# Patient Record
(demographics unavailable — no encounter records)

---

## 2024-10-23 NOTE — DISCUSSION/SUMMARY
[FreeTextEntry1] :   RAHEEL VILLA  presents today with right otitis media and will be treated with Amoxicillin.

## 2024-10-23 NOTE — HISTORY OF PRESENT ILLNESS
[FreeTextEntry6] : RAHEEL presents today with ear pain for 1 day. She was given Motrin that provided pain relief.

## 2024-10-23 NOTE — PHYSICAL EXAM
[Acute Distress] : no acute distress [Tenderness] : tenderness [EOMI] : grossly EOMI [Clear] : right tympanic membrane not clear [Erythema] : erythema [Bulging] : bulging [Erythematous Oropharynx] : nonerythematous oropharynx

## 2024-12-02 NOTE — PHYSICAL EXAM
[Acute Distress] : no acute distress [Alert] : alert [Tenderness] : no tenderness [EOMI] : grossly EOMI [Clear] : right tympanic membrane not clear [Erythema] : erythema [Bulging] : bulging [Purulent Effusion] : no purulent effusion [Erythematous Oropharynx] : nonerythematous oropharynx [Clear to Auscultation Bilaterally] : clear to auscultation bilaterally [Transmitted Upper Airway Sounds] : no transmitted upper airway sounds [Subcostal Retractions] : no subcostal retractions [Regular Rate and Rhythm] : regular rate and rhythm [Soft] : soft [Tender] : nontender

## 2024-12-02 NOTE — DISCUSSION/SUMMARY
[FreeTextEntry1] : RAHEEL presents today with right middle ear effusion, will send a wait and see antibiotic.

## 2024-12-18 NOTE — HISTORY OF PRESENT ILLNESS
[FreeTextEntry6] : RAHEEL presents today with a rash on her abdomen, no other sick contacts at home.

## 2025-01-13 NOTE — DISCUSSION/SUMMARY
[FreeTextEntry1] : RAHEEL presents today with cough and fever. She does have middle ear effusion. She will have a cbc to evaluate for any immune changes.

## 2025-01-13 NOTE — PHYSICAL EXAM
[Alert] : alert [EOMI] : grossly EOMI [Clear] : right tympanic membrane clear [Clear to Auscultation Bilaterally] : clear to auscultation bilaterally [Acute Distress] : no acute distress [Tenderness] : no tenderness [Pink Nasal Mucosa] : nasal mucosa not pink [Erythematous Oropharynx] : nonerythematous oropharynx [Transmitted Upper Airway Sounds] : no transmitted upper airway sounds [Subcostal Retractions] : no subcostal retractions

## 2025-02-17 NOTE — PHYSICAL EXAM
[Tenderness] : tenderness [EOMI] : grossly EOMI [Pink Nasal Mucosa] : nasal mucosa not pink [Erythematous Oropharynx] : nonerythematous oropharynx [Clear to Auscultation Bilaterally] : clear to auscultation bilaterally [Transmitted Upper Airway Sounds] : no transmitted upper airway sounds [Regular Rate and Rhythm] : regular rate and rhythm [Soft] : soft [Tender] : nontender [FreeTextEntry3] : Scattered erythema with crusting on outer ear with middle ear effusion

## 2025-02-17 NOTE — DISCUSSION/SUMMARY
[FreeTextEntry1] : RAHEEL presents today with impetigo on her outer ear, and will be treated with topical mupirocin. Will also send a wait and see oral Amoxicillin.

## 2025-04-10 NOTE — HISTORY OF PRESENT ILLNESS
[FreeTextEntry6] : RAHEEL presents today with fever and decreased energy. No other sick contacts at home.

## 2025-04-10 NOTE — DISCUSSION/SUMMARY
[FreeTextEntry1] : RAHEEL VILLA  presents today with sore throat, POCT strep is negative. Will defer antibiotics at this time and will await for final culture results.

## 2025-04-10 NOTE — PHYSICAL EXAM
[Alert] : alert [Clear] : right tympanic membrane clear [Acute Distress] : no acute distress [Tenderness] : no tenderness [EOMI] : no EOMI  [FreeTextEntry4] : Scratch on nose

## 2025-06-27 NOTE — HISTORY OF PRESENT ILLNESS
[Father] : father [Fruit] : fruit [Vegetables] : vegetables [Meat] : meat [Grains] : grains [Eggs] : eggs [Fish] : fish [Dairy] : dairy [Normal] : Normal [Brushing teeth] : Brushing teeth [Yes] : Patient goes to dentist yearly [Tap water] : Primary Fluoride Source: Tap water [Grade ___] : Grade [unfilled] [No] : Not at  exposure [Car seat in back seat] : Car seat in back seat [Carbon Monoxide Detectors] : Carbon monoxide detectors [Smoke Detectors] : Smoke detectors [Up to date] : Up to date [NO] : No [FreeTextEntry7] : Healthy [de-identified] : none [de-identified] : Radha [FreeTextEntry1] : Well visit. NKA. She eats well, sometimes picky, sleeps well, has regular BMs and voids.

## 2025-06-27 NOTE — DISCUSSION/SUMMARY
[Normal Growth] : growth [Normal Development] : development [None] : No known medical problems [No Elimination Concerns] : elimination [No Feeding Concerns] : feeding [No Skin Concerns] : skin [Normal Sleep Pattern] : sleep [School Readiness] : school readiness [Mental Health] : mental health [Nutrition and Physical Activity] : nutrition and physical activity [Oral Health] : oral health [Safety] : safety [No Medications] : ~He/She~ is not on any medications [Patient] : patient [Mother] : mother [Full Activity without restrictions including Physical Education & Athletics] : Full Activity without restrictions including Physical Education & Athletics [I have examined the above-named student and completed the preparticipation physical evaluation. The athlete does not present apparent clinical contraindications to practice and participate in sport(s) as outlined above. A copy of the physical exam is on r] : I have examined the above-named student and completed the preparticipation physical evaluation. The athlete does not present apparent clinical contraindications to practice and participate in sport(s) as outlined above. A copy of the physical exam is on record in my office and can be made available to the school at the request of the parents. If conditions arise after the athlete has been cleared for participation, the physician may rescind the clearance until the problem is resolved and the potential consequences are completely explained to the athlete (and parents/guardians). [FreeTextEntry1] : Annual PE/Dental. Balanced nutrition and summertime safety reviewed.

## 2025-06-27 NOTE — PHYSICAL EXAM
[Alert] : alert [No Acute Distress] : no acute distress [Normocephalic] : normocephalic [Conjunctivae with no discharge] : conjunctivae with no discharge [PERRL] : PERRL [EOMI Bilateral] : EOMI bilateral [Auricles Well Formed] : auricles well formed [Clear Tympanic membranes with present light reflex and bony landmarks] : clear tympanic membranes with present light reflex and bony landmarks [No Discharge] : no discharge [Nares Patent] : nares patent [Pink Nasal Mucosa] : pink nasal mucosa [Palate Intact] : palate intact [Nonerythematous Oropharynx] : nonerythematous oropharynx [Supple, full passive range of motion] : supple, full passive range of motion [No Palpable Masses] : no palpable masses [Symmetric Chest Rise] : symmetric chest rise [Clear to Auscultation Bilaterally] : clear to auscultation bilaterally [Regular Rate and Rhythm] : regular rate and rhythm [Normal S1, S2 present] : normal S1, S2 present [No Murmurs] : no murmurs [+2 Femoral Pulses] : +2 femoral pulses [Soft] : soft [NonTender] : non tender [Non Distended] : non distended [Normoactive Bowel Sounds] : normoactive bowel sounds [No Hepatomegaly] : no hepatomegaly [No Splenomegaly] : no splenomegaly [No Abnormal Lymph Nodes Palpated] : no abnormal lymph nodes palpated [No Gait Asymmetry] : no gait asymmetry [No pain or deformities with palpation of bone, muscles, joints] : no pain or deformities with palpation of bone, muscles, joints [Normal Muscle Tone] : normal muscle tone [Straight] : straight [+2 Patella DTR] : +2 patella DTR [Cranial Nerves Grossly Intact] : cranial nerves grossly intact [No Rash or Lesions] : no rash or lesions [Tomás: ____] : Tomás [unfilled] [Tomás: _____] : Tomás [unfilled]